# Patient Record
Sex: MALE | Race: WHITE | NOT HISPANIC OR LATINO | Employment: UNEMPLOYED | URBAN - METROPOLITAN AREA
[De-identification: names, ages, dates, MRNs, and addresses within clinical notes are randomized per-mention and may not be internally consistent; named-entity substitution may affect disease eponyms.]

---

## 2021-08-06 DIAGNOSIS — G62.9 NEUROPATHY: ICD-10-CM

## 2021-10-04 ENCOUNTER — APPOINTMENT (OUTPATIENT)
Dept: NEUROLOGY | Facility: MEDICAL CENTER | Age: 56
End: 2021-10-04
Attending: STUDENT IN AN ORGANIZED HEALTH CARE EDUCATION/TRAINING PROGRAM

## 2023-11-16 ENCOUNTER — TELEPHONE (OUTPATIENT)
Dept: HEALTH INFORMATION MANAGEMENT | Facility: OTHER | Age: 58
End: 2023-11-16
Payer: MEDICAID

## 2024-03-18 ENCOUNTER — TELEPHONE (OUTPATIENT)
Dept: NEUROLOGY | Facility: MEDICAL CENTER | Age: 59
End: 2024-03-18
Payer: MEDICAID

## 2024-03-18 NOTE — TELEPHONE ENCOUNTER
NEUROLOGY PATIENT PRE-VISIT PLANNING     Patient was NOT contacted to complete PVP.  Note: Patient will not be contacted if there is no indication to call.     Patient Appointment is scheduled as: New Patient     Is visit type and length scheduled correctly? Yes    [employer] Patient is checked in Patient Demographics? Yes    3.   Is referral attached to visit? Yes    4. Were records received from referring provider? Yes    4. Patient was NOT contacted to have someone accompany them to visit.     5. Is this appointment scheduled as a Hospital Follow-Up?  No    6. Does the patient require any pre procedure or post procedure follow up? No    7. If any orders were placed at last visit or intended to be done for this visit do we have Results/Consult Notes? No  Labs -  First Visit   Imaging - Imaging was not ordered at last office visit.  Referrals - No referrals were ordered at last office visit.  Note: If patient appointment is for lab or imaging review and patient did not complete the studies, check with provider if OK to reschedule patient until completed.    8. If patient appointment is for Botox - is order pended for provider? N/A    9. Was Plan Assessment from last Neurology Office Visit Reviewed?  Yes

## 2024-03-19 ENCOUNTER — HOSPITAL ENCOUNTER (OUTPATIENT)
Dept: LAB | Facility: MEDICAL CENTER | Age: 59
End: 2024-03-19
Attending: PSYCHIATRY & NEUROLOGY
Payer: MEDICAID

## 2024-03-19 ENCOUNTER — OFFICE VISIT (OUTPATIENT)
Dept: NEUROLOGY | Facility: MEDICAL CENTER | Age: 59
End: 2024-03-19
Attending: PSYCHIATRY & NEUROLOGY
Payer: MEDICAID

## 2024-03-19 VITALS
BODY MASS INDEX: 30.25 KG/M2 | DIASTOLIC BLOOD PRESSURE: 62 MMHG | HEIGHT: 72 IN | HEART RATE: 67 BPM | OXYGEN SATURATION: 95 % | WEIGHT: 223.33 LBS | SYSTOLIC BLOOD PRESSURE: 126 MMHG | TEMPERATURE: 97.3 F

## 2024-03-19 DIAGNOSIS — G60.8 PERIPHERAL SENSORY-MOTOR AXONAL POLYNEUROPATHY: Primary | ICD-10-CM

## 2024-03-19 LAB
APTT PPP: 30.7 SEC (ref 24.7–36)
CRP SERPL HS-MCNC: 2.8 MG/L (ref 0–3)
INR PPP: 1.01 (ref 0.87–1.13)
PROTHROMBIN TIME: 13.4 SEC (ref 12–14.6)

## 2024-03-19 PROCEDURE — 99205 OFFICE O/P NEW HI 60 MIN: CPT | Performed by: PSYCHIATRY & NEUROLOGY

## 2024-03-19 PROCEDURE — 99202 OFFICE O/P NEW SF 15 MIN: CPT | Performed by: PSYCHIATRY & NEUROLOGY

## 2024-03-19 PROCEDURE — 86038 ANTINUCLEAR ANTIBODIES: CPT

## 2024-03-19 PROCEDURE — 85610 PROTHROMBIN TIME: CPT

## 2024-03-19 PROCEDURE — 99417 PROLNG OP E/M EACH 15 MIN: CPT | Performed by: PSYCHIATRY & NEUROLOGY

## 2024-03-19 PROCEDURE — 86141 C-REACTIVE PROTEIN HS: CPT

## 2024-03-19 PROCEDURE — 36415 COLL VENOUS BLD VENIPUNCTURE: CPT

## 2024-03-19 PROCEDURE — 85730 THROMBOPLASTIN TIME PARTIAL: CPT

## 2024-03-19 RX ORDER — LORAZEPAM 2 MG/1
TABLET ORAL
COMMUNITY
Start: 2017-12-19

## 2024-03-19 RX ORDER — ATENOLOL 100 MG/1
1 TABLET ORAL
COMMUNITY

## 2024-03-19 RX ORDER — HYDROCHLOROTHIAZIDE 25 MG/1
1 TABLET ORAL
COMMUNITY

## 2024-03-19 RX ORDER — MULTIVIT WITH MINERALS/LUTEIN
1000 TABLET ORAL DAILY
COMMUNITY

## 2024-03-19 RX ORDER — CLONIDINE 0.1 MG/24H
1 PATCH, EXTENDED RELEASE TRANSDERMAL
Qty: 4 PATCH | Refills: 0 | Status: SHIPPED | OUTPATIENT
Start: 2024-03-19 | End: 2024-03-22 | Stop reason: SDUPTHER

## 2024-03-19 RX ORDER — IBUPROFEN 800 MG/1
800 TABLET ORAL
COMMUNITY

## 2024-03-19 RX ORDER — ALPRAZOLAM 0.25 MG/1
TABLET ORAL
Qty: 6 TABLET | Refills: 0 | Status: SHIPPED | OUTPATIENT
Start: 2024-03-19 | End: 2024-03-22 | Stop reason: SDUPTHER

## 2024-03-19 RX ORDER — IRBESARTAN 150 MG/1
300 TABLET ORAL
COMMUNITY

## 2024-03-19 RX ORDER — ASPIRIN 81 MG/1
81 TABLET, CHEWABLE ORAL DAILY
COMMUNITY

## 2024-03-19 RX ORDER — ZOLPIDEM TARTRATE 10 MG/1
1 TABLET ORAL
COMMUNITY
Start: 2023-03-19

## 2024-03-19 RX ORDER — LOSARTAN POTASSIUM 100 MG/1
1 TABLET ORAL
COMMUNITY

## 2024-03-19 RX ORDER — AMLODIPINE BESYLATE 10 MG/1
1 TABLET ORAL
COMMUNITY

## 2024-03-19 ASSESSMENT — ENCOUNTER SYMPTOMS
NAUSEA: 0
CONSTIPATION: 0
SENSORY CHANGE: 1
PHOTOPHOBIA: 0
LOSS OF CONSCIOUSNESS: 0
WEIGHT LOSS: 0
DOUBLE VISION: 0
FALLS: 0
NECK PAIN: 0
FOCAL WEAKNESS: 1
SPEECH CHANGE: 0
MEMORY LOSS: 0
DIZZINESS: 0
TREMORS: 0

## 2024-03-19 ASSESSMENT — PATIENT HEALTH QUESTIONNAIRE - PHQ9: CLINICAL INTERPRETATION OF PHQ2 SCORE: 0

## 2024-03-19 NOTE — PROGRESS NOTES
Subjective     Troy Rojas is a 58 y.o. male who presents with his wife, Marce, from the office of Dr. Chad Conde DO, for consultation, with a with a 16-17-year history of progressive paresthesias consistent with a sensory polyneuropathy.    ROSS Simmons is a very pleasant, though unfortunate, 4-year-old right-handed gentleman whose symptoms started a week he estimates in 2007 or 2008.  Initially just an annoying numbness and tingling, but has since become more painful and distended.  He now has pain and numbness that are constant below the midshin and now mid forearm as well.  The symptoms are intense of the right side.  He also has anywhere of a constrictive sensation around the lower abdominal cavity and there has been numbness and tingling and sensory distortions in the perianal perigenital regions.  He also describes some analgesia in his feet, a delayed perception of temperature extremes.  He has not been injuring them.  He is now wearing loose shoes, he never goes barefoot.    In the hand symptoms started in the fingers but have ascended now to the forearms.  He describes the same type of painful tingling and numbness, there is less of a hypersensitivity in the hands but he does have more noticeable analgesia.  He has injured his fingertips if he is not careful.    He has begun to develop a foot drop, he trips much more commonly if he is not looking at the ground.  There is also loss of strength with grasp and dexterity with the fingers.  There is incoordination if he is not looking at the objects he is trying to grab.  Again the symptoms are more pronounced on the right side.  He finds himself fatigued more readily with physical activity.    Balance has been more curtailed over time.  He has lights on all the time, looking at the ground consistently.  Because of the incoordination in his hands, using a walking stick is problematic.  His gait has become more cautious, he walks flat-footed as a result.   He cannot walk on uneven surfaces, on stairs he has to look down and uses a banister.  At home he wall walks and sofa surfs.    Bowel and bladder control have not been compromised, there is significant ED.  He denies any orthostatic changes with syncope, early satiety when he eats with nausea and vomiting, urinary retention, changes in skin texture or color, sweating abnormalities, lower extremity edema, etc.  He has had no problems with rash, skin changes, severe neck pain with radiation, or any bulbar symptoms, visual changes, or cognitive deficits.    Review of records indicates that he has had a very thorough workup dating back to earlier care locally by different neurologist in this community.  At that time EMG studies evidently demonstrated some type of sensory neuropathy.  He was then seen by Dr. Phil Leonard MD, a neurologist in the [facility].  EMG studies were repeated revealing a sensory motor polyneuropathy.  He also had a rather thorough serologic workup including paraneoplastic, paraproteinemia, CNS autoimmune, heavy metals (professionally, he worked in a mine), systemic autoimmune (including celiac disease screen) as well as most infectious, nutritional and endocrine causes.  There was a slight elevation of Asialo GM1 antibody titers, but this was felt to be a false positive as this type of antibody syndrome was associated with pure motor polyneuropathy.  Recommendations for CSF as well as MRI with and without contrast of the cervical spine and possible lip and salivary gland biopsies were made, the patient decided he had had enough.  He has never had a rheumatologic consultation.      Symptomatically he was placed on gabapentin which was poorly tolerated, the same for Elavil.  Now on Lyrica 400 mg, twice daily, he has consistent though still incomplete benefit seen.  He has never been on higher doses.  He tolerates the drug without issue.    He has no medical history of note,  denying diabetes, thyroid disease, thyroid disease, malignancy, blood dyscrasia, neurodegenerative disease, MS, seizure, migraine, hypertension, liver or kidney disease, pulmonary disease, CAD, PVD, CVA or diagnosed autoimmune disease.    There is no surgical history of note from my standpoint.    His mother suffer from dementia, his father  from complications of stroke and hypertension.  His brother and 3 children are all alive and well.  No one in the family has a history of similar symptoms or has held a diagnosis of neuropathy that he is aware of.    He does have a history of moderate alcohol use, he averages now about 3-4 beverages every evening, he does not smoke, having given this years prior.  He is a retired mind supervisor.    He is on Lyrica 200 mg, twice daily, Cozaar, Avapro, Motrin 80 mg as needed, HCTZ, Tenormin, baby aspirin daily, Norvasc, Ambien 10 mg nightly and Ativan 2 mg as needed.      Review of Systems   Constitutional:  Positive for malaise/fatigue. Negative for weight loss.   Eyes:  Negative for double vision and photophobia.   Cardiovascular:  Negative for leg swelling.   Gastrointestinal:  Negative for constipation and nausea.   Genitourinary:  Negative for dysuria.   Musculoskeletal:  Negative for falls and neck pain.   Neurological:  Positive for sensory change and focal weakness. Negative for dizziness, tremors, speech change and loss of consciousness.   Psychiatric/Behavioral:  Negative for memory loss.    All other systems reviewed and are negative.    Objective     /62 (BP Location: Right arm, Patient Position: Sitting, BP Cuff Size: Adult)   Pulse 67   Temp 36.3 °C (97.3 °F) (Temporal)   Ht 1.829 m (6')   Wt 101 kg (223 lb 5.2 oz)   SpO2 95%   BMI 30.29 kg/m²      Physical Exam    He appears in no acute distress.  Vital signs are stable.  There is no malar rash, jaw or temporal tenderness, or jaw claudication.  The neck is supple, range of motion is full,  Lhermitte's phenomena is absent, compression maneuvers are positive only with the head rotated to the right side with an increase in the hand paresthesias bilaterally.  There is no tenderness at the elbow or wrists, straight leg raising is negative bilaterally.  Distal pulses are intact.    There is a chronic reddened and brownish discoloration of the lower extremities distal to the midshin, there is bilateral pretibial edema.     Neurological Exam    Fully oriented, there is no aphasia, apraxia, or inattention.    PERRLA/EOMI, visual fields are full to finger counting on confrontation bilaterally.  Funduscopic exam reveals sharp disc margins.  Facial movements are symmetric, sensory exam is intact to light touch, temperature and pinprick bilaterally.  The tongue and uvula are midline, there is no bulbar dysfunction.  Jaw movements are intact, jaw jerk is absent.  Shoulder shrug and head rotation are normal.    Musculoskeletal exam reveals normal tone throughout, there is no tremor or drift.  There is no atrophy.  There is spontaneous dyskinetic movements involving the right hand more so than left, with both extension and then flexion of the fingers.  There is no drift.  Strength assessment reveals weakness of the hand intrinsic musculatures as well as the wrist flexors and extensors bilaterally.  The lower extremity weakness is with dorsiflexion and EHL at 4+-5/5, plantarflexion is slightly more weakened bilaterally at 4+/5.    Reflexes are diffusely hypoactive to absent, there are no clear asymmetries as a result.  The toes are equivocal bilaterally.    Fine motor control is diminished proportionate to the weakness in the feet, more so due to dystonic movements in both hands, left more than right sided.  There is slight appendicular incoordination in the upper extremities on finger-2-nose testing when his eyes are closed.  There is none when his eyes are open.  The movements are simply slower.    Gait is notably  distorted with widened station, a clear steppage quality, and even with his eyes open and feet spread apart, he has difficulty maintaining balance.  Closing his eyes results and significant instability.  Stride length is shortened, he does not shuffle.    Sensory exam shows significant abnormalities.  Vibration is lost below the knees bilaterally, below the wrist on the right, above the wrist on the left, temperature is lost below the elbows bilaterally, absent below the knees, pinprick is lost also below the elbows, below the knee on the right, it is lost above the knee on the left, JPS is actually delayed up to the level of the elbows, absent distally, also delayed at the knees, absent distally.    Assessment & Plan     1. Peripheral sensory-motor axonal polyneuropathy  Gentleman is suffering from a very severe sensorimotor polyneuropathy, at this point in time of unknown etiology.  The history and symptoms suggest a length-dependent axonal process, the predominance of sensory or motor symptoms initially would be consistent with this fact.  Regardless, some additional workup should be performed, especially given his disease severity and relatively young age at outset.  Though I doubt this is an autoimmune process, whether primary CNS or secondary related to a systemic illness, but still CSF study should be obtained.  MRI of the cervical spine with and without contrast can be helpful if there is enhancement of multiple nerve roots.  I do not think he is overtly myelopathic so I doubt we will see any structural pathology involving the spinal cord itself.  Some additional blood work should be run since there was a suspicion of Sjogren syndrome in previous notes, so I will repeat Sjogren's panel, GOSIA, CRP, and chromatin antibodies.  If this does turn out to be an unusual presentation of Sjogren's, or something close to it, then may be biopsy might be appropriate.  Certainly, a rheumatologic consult would be  appropriate.    Though this was not directly addressed, I am concerned about the amount of alcohol he is drinking and continues to drink.  Though I doubt this is a purely alcohol-related sensory polyneuropathy, motor involvement would be inconsistent with this diagnosis, it is in all likelihood playing a big role and probably alcohol should be stopped.  This is the singular thing he can do for himself to slow progression down.  He and I will talk about this in greater length when we follow-up.    Symptomatically, I doubt dose escalation with Lyrica will prove more beneficial given the dose he is already on.  He will stay on the Lyrica since it does provide benefit.  Catapres-TTS 0.1 mg patch will be started, but caution needs to be exercised given its potential for hypotension.  Side effects were reviewed.  In several weeks he should begin to see some additional benefit, if so, he will notify the office and further prescriptions can be written.  If there are problems with low blood pressure, we might be able to adjust his antihypertensive regimen.  If there is no benefit at this dose, going further would likely be fruitless, so I will try mexiletine, a different membrane stabilizer like lamotrigine, or may be another TCA.    We spent some time talking about the likely diagnosis and what his long-term prognosis will likely be.  He is accommodating well.  He is still driving safely though he also understands that this will be limited.  If blood tests and CSF studies are abnormal, suggestive of an autoimmune process, then IVIG and other immunosuppressive treatments will be initiated.  We will follow-up otherwise in the next several months.    - GOSIA W/REFLEX IF POSITIVE  - C-REACTIVE PROTEIN CARDIAC  - MR-CERVICAL SPINE-WITH & W/O; Future  - ALPRAZolam (XANAX) 0.25 MG Tab; 1-2 tab 1 hour prior to MRI, repeat every hour as needed  Indications: Feeling Anxious  Dispense: 6 Tablet; Refill: 0  - DX-LUMBAR PUNCTURE FOR  DIAGNOSIS; Future  - IGG, CSF INDEX; Future  - CSF GLUCOSE; Future  - CSF Cell Count; Future  - OLIGOCLONAL BANDS SERUM/CSF; Future  - CSF PROTEIN; Future  - Encephalopathy Autoimmune Eval (CSF); Future  - Paraneoplastic Autoantibody Eval (CSF); Future  - VDRL CSF; Future  - PATHOLOGY MEDICAL CYTOLOGY; Future  - PT AND PTT  - CBC WITHOUT DIFFERENTIAL; Future  - cloNIDine (CATAPRES) 0.1 MG/24HR PATCH WEEKLY patch; Place 1 Patch on the skin every 7 days.  Dispense: 4 Patch; Refill: 0  - SJOGREN'S AB, ANTI-SS-A/-SS-B  - CHROMATIN AB,IGG; Future    Time: 80 minutes in total spent in patient care including creatinine charting, record review, discussion with healthcare staff and documentation.  This includes face-to-face time for exam, review, discussion, as well as counseling and coordinating care.

## 2024-03-22 DIAGNOSIS — G60.8 PERIPHERAL SENSORY-MOTOR AXONAL POLYNEUROPATHY: ICD-10-CM

## 2024-03-22 LAB — NUCLEAR IGG SER QL IA: NORMAL

## 2024-03-22 NOTE — TELEPHONE ENCOUNTER
PLEASE RESEND THESE PRESCRIPTIONS TO [facility] IN [place]! THANK YOU IN ADVANCE!

## 2024-03-26 DIAGNOSIS — G60.8 PERIPHERAL SENSORY-MOTOR AXONAL POLYNEUROPATHY: ICD-10-CM

## 2024-03-26 RX ORDER — ALPRAZOLAM 0.25 MG/1
TABLET ORAL
Qty: 6 TABLET | Refills: 0 | Status: SHIPPED | OUTPATIENT
Start: 2024-03-26 | End: 2024-04-22

## 2024-03-26 RX ORDER — CLONIDINE 0.1 MG/24H
1 PATCH, EXTENDED RELEASE TRANSDERMAL
Qty: 4 PATCH | Refills: 0 | Status: SHIPPED | OUTPATIENT
Start: 2024-03-26

## 2024-04-08 ENCOUNTER — APPOINTMENT (OUTPATIENT)
Dept: RADIOLOGY | Facility: MEDICAL CENTER | Age: 59
End: 2024-04-08
Attending: PSYCHIATRY & NEUROLOGY
Payer: MEDICAID

## 2024-04-11 ENCOUNTER — HOSPITAL ENCOUNTER (OUTPATIENT)
Dept: RADIOLOGY | Facility: MEDICAL CENTER | Age: 59
End: 2024-04-11
Attending: PSYCHIATRY & NEUROLOGY
Payer: MEDICAID

## 2024-04-11 ENCOUNTER — HOSPITAL ENCOUNTER (OUTPATIENT)
Facility: MEDICAL CENTER | Age: 59
End: 2024-04-11
Attending: PSYCHIATRY & NEUROLOGY
Payer: MEDICAID

## 2024-04-11 DIAGNOSIS — G60.8 PERIPHERAL SENSORY-MOTOR AXONAL POLYNEUROPATHY: ICD-10-CM

## 2024-04-11 LAB
BURR CELLS/RBC NFR CSF MANUAL: 0 %
CLARITY CSF: CLEAR
COLOR CSF: COLORLESS
COLOR SPUN CSF: COLORLESS
CSF COMMENTS 1658: NORMAL
CYTOLOGY REG CYTOL: NORMAL
GLUCOSE CSF-MCNC: 64 MG/DL (ref 40–80)
NUC CELL # CSF: 2 CELLS/UL (ref 0–10)
PROT CSF-MCNC: 27 MG/DL (ref 15–45)
RBC # CSF: <1 CELLS/UL
SPECIMEN VOL CSF: 16 ML
TUBE # CSF: 3
TUBE # CSF: NORMAL

## 2024-04-11 PROCEDURE — 89051 BODY FLUID CELL COUNT: CPT

## 2024-04-11 PROCEDURE — 86341 ISLET CELL ANTIBODY: CPT

## 2024-04-11 PROCEDURE — 62328 DX LMBR SPI PNXR W/FLUOR/CT: CPT

## 2024-04-11 PROCEDURE — 82042 OTHER SOURCE ALBUMIN QUAN EA: CPT

## 2024-04-11 PROCEDURE — 82784 ASSAY IGA/IGD/IGG/IGM EACH: CPT | Mod: 91

## 2024-04-11 PROCEDURE — 83916 OLIGOCLONAL BANDS: CPT

## 2024-04-11 PROCEDURE — 88108 CYTOPATH CONCENTRATE TECH: CPT

## 2024-04-11 PROCEDURE — 82040 ASSAY OF SERUM ALBUMIN: CPT

## 2024-04-11 PROCEDURE — 84157 ASSAY OF PROTEIN OTHER: CPT

## 2024-04-11 PROCEDURE — 82945 GLUCOSE OTHER FLUID: CPT

## 2024-04-11 PROCEDURE — 86255 FLUORESCENT ANTIBODY SCREEN: CPT | Mod: 91

## 2024-04-11 PROCEDURE — 86592 SYPHILIS TEST NON-TREP QUAL: CPT

## 2024-04-14 LAB — VDRL CSF QL: NON REACTIVE

## 2024-04-16 LAB
OLIGOCLONAL BANDS CSF ELPH-IMP: NORMAL
OLIGOCLONAL BANDS CSF IEF: NORMAL BANDS (ref 0–1)
OLIGOCLONAL BANDS.IT SER+CSF QL: NEGATIVE

## 2024-04-17 LAB
ALB CSF/SERPL: 4 RATIO (ref 0–9)
ALBUMIN CSF-MCNC: 20 MG/DL (ref 0–35)
ALBUMIN SERPL-MCNC: 4999 MG/DL (ref 3500–5200)
IGG CSF-MCNC: 1.8 MG/DL (ref 0–6)
IGG SERPL-MCNC: 966 MG/DL (ref 768–1632)
IGG SYNTH RATE SER+CSF CALC-MRATE: <0 MG/D
IGG/ALB CLEAR SER+CSF-RTO: 0.47 RATIO (ref 0.28–0.66)
IGG/ALB CSF: 0.09 RATIO (ref 0.09–0.25)

## 2024-04-25 ENCOUNTER — HOSPITAL ENCOUNTER (OUTPATIENT)
Dept: RADIOLOGY | Facility: MEDICAL CENTER | Age: 59
End: 2024-04-25
Attending: PSYCHIATRY & NEUROLOGY
Payer: MEDICAID

## 2024-04-25 DIAGNOSIS — G60.8 PERIPHERAL SENSORY-MOTOR AXONAL POLYNEUROPATHY: ICD-10-CM

## 2024-04-25 PROCEDURE — 700117 HCHG RX CONTRAST REV CODE 255: Mod: UD | Performed by: PSYCHIATRY & NEUROLOGY

## 2024-04-25 PROCEDURE — A9579 GAD-BASE MR CONTRAST NOS,1ML: HCPCS | Mod: UD | Performed by: PSYCHIATRY & NEUROLOGY

## 2024-04-25 PROCEDURE — 72156 MRI NECK SPINE W/O & W/DYE: CPT

## 2024-04-25 RX ADMIN — GADOTERIDOL 20 ML: 279.3 INJECTION, SOLUTION INTRAVENOUS at 16:30

## 2024-04-29 DIAGNOSIS — G60.8 PERIPHERAL SENSORY-MOTOR AXONAL POLYNEUROPATHY: ICD-10-CM

## 2024-04-29 RX ORDER — CLONIDINE 0.1 MG/24H
1 PATCH, EXTENDED RELEASE TRANSDERMAL
Qty: 4 PATCH | Refills: 0 | Status: SHIPPED | OUTPATIENT
Start: 2024-04-29 | End: 2024-04-30 | Stop reason: SDUPTHER

## 2024-04-30 DIAGNOSIS — G60.8 PERIPHERAL SENSORY-MOTOR AXONAL POLYNEUROPATHY: ICD-10-CM

## 2024-04-30 RX ORDER — CLONIDINE 0.1 MG/24H
1 PATCH, EXTENDED RELEASE TRANSDERMAL
Qty: 12 PATCH | Refills: 1 | Status: SHIPPED | OUTPATIENT
Start: 2024-04-30

## 2024-04-30 NOTE — TELEPHONE ENCOUNTER
PLEASE RESEND THIS PTS SCRIPT FOR A 90-DAY SUPPLY- MEDICAID WILL NOT FILL OTHERWISE! THANK YOU IN ADVANCE! Estrella Scott, Med Ass't

## 2024-05-03 LAB — TEST NAME 95000: NORMAL

## 2024-08-05 DIAGNOSIS — G60.8 PERIPHERAL SENSORY-MOTOR AXONAL POLYNEUROPATHY: ICD-10-CM

## 2024-08-05 RX ORDER — PREGABALIN 200 MG/1
400 CAPSULE ORAL 2 TIMES DAILY
Qty: 360 CAPSULE | Refills: 1 | Status: SHIPPED | OUTPATIENT
Start: 2024-08-05 | End: 2025-02-01

## 2024-08-06 ENCOUNTER — TELEPHONE (OUTPATIENT)
Dept: PHARMACY | Facility: MEDICAL CENTER | Age: 59
End: 2024-08-06
Payer: MEDICAID

## 2024-08-06 NOTE — TELEPHONE ENCOUNTER
Received New Start request via [employer]  for     LYRICA 200 MG capsule   . (Quantity:360, Day Supply:90)     Insurance: [id number]  Member ID:  [id number]  BIN: [id number]  PCN: [id number]  [id number]     Ran Test claim via [place] & medication Pays for a $0 copay. Will outreach to patient to offer specialty pharmacy services and or release to preferred pharmacy

## 2025-04-10 ENCOUNTER — TELEPHONE (OUTPATIENT)
Dept: PHARMACY | Facility: MEDICAL CENTER | Age: 60
End: 2025-04-10

## 2025-04-10 ENCOUNTER — OFFICE VISIT (OUTPATIENT)
Dept: NEUROLOGY | Facility: MEDICAL CENTER | Age: 60
End: 2025-04-10
Attending: PSYCHIATRY & NEUROLOGY
Payer: MEDICARE

## 2025-04-10 VITALS
DIASTOLIC BLOOD PRESSURE: 78 MMHG | RESPIRATION RATE: 16 BRPM | WEIGHT: 231.92 LBS | HEIGHT: 72 IN | HEART RATE: 67 BPM | OXYGEN SATURATION: 97 % | BODY MASS INDEX: 31.41 KG/M2 | SYSTOLIC BLOOD PRESSURE: 124 MMHG | TEMPERATURE: 97.4 F

## 2025-04-10 DIAGNOSIS — G60.8 PERIPHERAL SENSORY-MOTOR AXONAL POLYNEUROPATHY: Primary | ICD-10-CM

## 2025-04-10 PROCEDURE — 99212 OFFICE O/P EST SF 10 MIN: CPT | Performed by: PSYCHIATRY & NEUROLOGY

## 2025-04-10 PROCEDURE — 3074F SYST BP LT 130 MM HG: CPT | Performed by: PSYCHIATRY & NEUROLOGY

## 2025-04-10 PROCEDURE — 3078F DIAST BP <80 MM HG: CPT | Performed by: PSYCHIATRY & NEUROLOGY

## 2025-04-10 PROCEDURE — 99215 OFFICE O/P EST HI 40 MIN: CPT | Performed by: PSYCHIATRY & NEUROLOGY

## 2025-04-10 RX ORDER — FLUTICASONE PROPIONATE 50 MCG
1 SPRAY, SUSPENSION (ML) NASAL DAILY
COMMUNITY

## 2025-04-10 RX ORDER — TAMSULOSIN HYDROCHLORIDE 0.4 MG/1
0.4 CAPSULE ORAL DAILY
COMMUNITY
Start: 2025-02-24

## 2025-04-10 RX ORDER — LAMOTRIGINE 50 MG/1
TABLET, EXTENDED RELEASE ORAL
Qty: 60 TABLET | Refills: 3 | Status: SHIPPED | OUTPATIENT
Start: 2025-04-10

## 2025-04-10 RX ORDER — PREGABALIN 200 MG/1
400 CAPSULE ORAL 2 TIMES DAILY
Qty: 360 CAPSULE | Refills: 3 | Status: SHIPPED | OUTPATIENT
Start: 2025-04-10 | End: 2026-04-05

## 2025-04-10 RX ORDER — PREGABALIN 200 MG/1
400 CAPSULE ORAL 2 TIMES DAILY
COMMUNITY
End: 2025-04-10 | Stop reason: SDUPTHER

## 2025-04-10 ASSESSMENT — PATIENT HEALTH QUESTIONNAIRE - PHQ9
CLINICAL INTERPRETATION OF PHQ2 SCORE: 2
5. POOR APPETITE OR OVEREATING: 0 - NOT AT ALL
SUM OF ALL RESPONSES TO PHQ QUESTIONS 1-9: 10

## 2025-04-10 NOTE — PROGRESS NOTES
NEUROLOGY PROGRESS/FOLLOW-UP NOTE    Referring provider: Dr. Chad Conde, DO    Subjective:    CC:  Niraj appears today for follow-up with a history of a very painful sensorimotor axonal polyneuropathy.    HPI:  Since last seen, he states the sensorimotor symptoms in all 4 extremities have increased slightly, mostly it is the sensory issues which have become more intense.  The burning sensation remains in the feet, though the numbness still ascends to the knees and slightly above.  There is still loss of sensation as well though he has not injured his feet.  There is still the foot drop, he always tends to catch the toes of his shoes on the small based rugs that they have at home.  There is no cramping.  The sensory symptoms worsen as the day goes on, improves when he takes the weight off.  There is no restlessness with the symptoms, they always feel better when he is getting into bed.  Sleeping with the sheets over his feet is becoming a bit more noticeable and uncomfortable.    In the hands there is still the numbness that extends to the elbows, there is a loss of sensation in the fingertips which is becoming more problematic when he comes to fine motor control.  He drops things left and right if he is not looking at them.  There is also loss of sensation, he has injured his fingers on an occasion without knowing it.    Balance remains curtailed, he has a walking stick which he uses anytime he is out and about walking on the pads and in the woods.  He has fallen on an occasion, twice last year and once already this year.  He knows to keep his eye on the ground.    Still on Lyrica 400 mg twice daily, he takes this with Tylenol ES, 2 tablets, twice a day.  This cocktail actually works better than the pregabalin alone!  He failed a trial of Catapres because of chemical reaction to the adhesive.  He was already failed gabapentin because of inefficacy.    He did undergo a rather thorough workup to complete evaluations  including CSF study, imaging of the spinal axis, etc.  All of these evaluations proved unremarkable.    Medical, surgical and family histories are reviewed, there are no known drug allergies.  He is on Lyrica 400 mg, twice daily, Tylenol ES, 2 tablet twice daily, Norvasc, HCTZ, Cozaar, Flomax, Ambien, Tenormin, vitamin C, and Avapro.    Objective:    Physical exam:    He appears in no acute distress.  Vital signs are stable.  There is no malar rash, temporal or jaw tenderness or jaw claudication.  The neck is supple.  Cardiac evaluation is unremarkable.  There is no tenderness at the elbow or wrist, there is no rash.  The darkened discoloration in the lower extremities distal to the mid shin is unchanged.  Straight leg raising is negative bilaterally.    Neurologic exam:    He is fully oriented, there is no aphasia, apraxia, or inattention.    PERRLA/EOMI, visual fields are grossly full, facial movements are symmetric and there is no bulbar dysfunction.  Smile is symmetric.  Shoulder shrug and head rotation are normal.    Musculoskeletal exam reveals normal tone, he moves all 4 extremities in similar fashion as was found 6 months ago.  The dyskinetic movements in both hands remain.  The weakness and clumsiness is still with the hands and fingers, in the lower extremities there is still the distal weakness as well.    Reflex exam was deferred.    Repetitive movements with the hands and feet are slow proportionate to the weakness seen.  The dystonic movements in the hands do not help matters.  With his eyes open, there is no obvious appendicular dystaxia.  Gait is widened in station.    Sensory exam is deferred.    Assessment and plan:     1.  Peripheral sensory-motor axonal polyneuropathy: He understands that this is a lifelong condition for him and that things will progress, but he also understands that it is difficult to know how quickly and how severe things might change to.  We are left with symptomatic relief.   Clonidine and gabapentin have been failed, we will try Lamictal ER 50 mg taken in the morning, and after 3 weeks increase to 100 mg depending on need and tolerability.  We did review side effects.  He was told it is safe to take with the other medicines that he is on.  Though suboptimal, Lyrica will also be continued unchanged.    He is already keenly aware and conscious of the balance difficulties that he has, the risk of having injuries occur without knowledge, subsequent infection, etc.    Fortunately, he does have a few other treatment options left to him, only time will tell as to which medications are most effective and well-tolerated.  We will follow-up in August.    Time: 40 minutes in total spent on patient care including pre-charting, record review, discussion with healthcare staff and documentation.  This includes face-to-face time for exam, review, discussion, as well as counseling and coordinating care.

## 2025-04-10 NOTE — TELEPHONE ENCOUNTER
Received New Start request via [employer]  for   LYRICA 200 MG capsule . (Quantity:360, Day Supply:90)     Insurance: [place]  Member ID:  [id number]  BIN: [id number]  PCN: [facility]  Group: [id number]     Ran Test claim via [place] & medication Rejects stating prior authorization is required.    Does this rx need to be for brand?

## 2025-04-14 NOTE — TELEPHONE ENCOUNTER
Prior Authorization for LYRICA 200 MG capsule  has been approved for a quantity of 360 , day supply 90    Prior Authorization reference number: [id number]  Insurance: [place]  Effective dates: 03/15/2025 to 4/14/26  Copay: $0     Is patient eligible to fill with [facility]? Yes    Next Steps: The Patients copay is less than $5.00. Will contact the patient to determine choice of pharmacy, if applicable.

## 2025-04-14 NOTE — TELEPHONE ENCOUNTER
Prior Authorization for LYRICA 200 MG capsule  (Quantity: 360, Days: 90) has been submitted via Cover My Meds: Key ([id number])    Insurance: [place]    Will follow up in 24-48 business hours.

## 2025-04-16 NOTE — TELEPHONE ENCOUNTER
Called and left a voice mail that the prior auth is approved and good to fill. Going to be releasing to pt's preferred pharmacy as noted on the rx.

## 2025-08-11 ENCOUNTER — APPOINTMENT (OUTPATIENT)
Facility: MEDICAL CENTER | Age: 60
End: 2025-08-11
Attending: PSYCHIATRY & NEUROLOGY
Payer: MEDICARE